# Patient Record
Sex: MALE | Race: WHITE | NOT HISPANIC OR LATINO | Employment: PART TIME | URBAN - METROPOLITAN AREA
[De-identification: names, ages, dates, MRNs, and addresses within clinical notes are randomized per-mention and may not be internally consistent; named-entity substitution may affect disease eponyms.]

---

## 2019-12-17 ENCOUNTER — CLINICAL SUPPORT (OUTPATIENT)
Dept: URGENT CARE | Facility: CLINIC | Age: 29
End: 2019-12-17

## 2019-12-17 PROCEDURE — 94010 BREATHING CAPACITY TEST: ICD-10-PCS | Mod: S$GLB,,, | Performed by: EMERGENCY MEDICINE

## 2019-12-17 PROCEDURE — 94010 BREATHING CAPACITY TEST: CPT | Mod: S$GLB,,, | Performed by: EMERGENCY MEDICINE

## 2019-12-18 ENCOUNTER — OFFICE VISIT (OUTPATIENT)
Dept: PULMONOLOGY | Facility: CLINIC | Age: 29
End: 2019-12-18
Payer: COMMERCIAL

## 2019-12-18 VITALS
OXYGEN SATURATION: 98 % | WEIGHT: 177.69 LBS | SYSTOLIC BLOOD PRESSURE: 142 MMHG | DIASTOLIC BLOOD PRESSURE: 76 MMHG | RESPIRATION RATE: 16 BRPM | HEIGHT: 71 IN | BODY MASS INDEX: 24.88 KG/M2 | HEART RATE: 88 BPM

## 2019-12-18 DIAGNOSIS — Z02.1 PRE-EMPLOYMENT EXAMINATION: ICD-10-CM

## 2019-12-18 PROCEDURE — 99203 PR OFFICE/OUTPT VISIT, NEW, LEVL III, 30-44 MIN: ICD-10-PCS | Mod: S$GLB,,, | Performed by: INTERNAL MEDICINE

## 2019-12-18 PROCEDURE — 99203 OFFICE O/P NEW LOW 30 MIN: CPT | Mod: S$GLB,,, | Performed by: INTERNAL MEDICINE

## 2019-12-18 PROCEDURE — 99999 PR PBB SHADOW E&M-EST. PATIENT-LVL III: ICD-10-PCS | Mod: PBBFAC,,, | Performed by: INTERNAL MEDICINE

## 2019-12-18 PROCEDURE — 99999 PR PBB SHADOW E&M-EST. PATIENT-LVL III: CPT | Mod: PBBFAC,,, | Performed by: INTERNAL MEDICINE

## 2019-12-18 PROCEDURE — 3008F PR BODY MASS INDEX (BMI) DOCUMENTED: ICD-10-PCS | Mod: CPTII,S$GLB,, | Performed by: INTERNAL MEDICINE

## 2019-12-18 PROCEDURE — 3008F BODY MASS INDEX DOCD: CPT | Mod: CPTII,S$GLB,, | Performed by: INTERNAL MEDICINE

## 2019-12-18 NOTE — PROGRESS NOTES
"12/18/2019    Shilpi Hewitt  New Patient History and Physical    Chief Complaint   Patient presents with    Follow-up       HPI: pt took inhaler when elementary school - never missed school at all, was on advair for 1 script- only one disc.  No symptoms after elementary school. Pt not sure ever had dx asthma.      Wishes to join [employer].  Had related had taken asthma medication to recuriter- had pft 12/17/19 with normal results.  Fev% 87%, fev1 108%.      No sinus nor cough/wheeze, nor dyspnea.  No symptoms since elementary school - since before 6th grade.      The chief compliant  problem is new to me",   PFSH:  No past medical history on file.      No past surgical history on file.  Social History     Tobacco Use    Smoking status: Never Smoker   Substance Use Topics    Alcohol use: Not on file    Drug use: Not on file     No family history on file.  Review of patient's allergies indicates:  No Known Allergies    Performance Status:The patient's activity level is regular exercise.      Review of Systems:  a review of eleven systems covering constitutional, Eye, HEENT, Psych, Respiratory, Cardiac, GI, , Musculoskeletal, Endocrine, Dermatologic was negative except for pertinent findings as listed ABOVE and below:  pertinent positive as above, rest is good       Exam:Comprehensive exam done. BP (!) 142/76   Pulse 88   Resp 16   Ht 5' 11" (1.803 m)   Wt 80.6 kg (177 lb 11.1 oz)   SpO2 98% Comment: room air  BMI 24.78 kg/m²   Exam included Vitals as listed, and patient's appearance and affect and alertness and mood, oral exam for yeast and hygiene and pharynx lesions and Mallapatti (M) score, neck with inspection for jvd and masses and thyroid abnormalities and lymph nodes (supraclavicular and infraclavicular nodes and axillary also examined and noted if abn), chest exam included symmetry and effort and fremitus and percussion and auscultation, cardiac exam included rhythm and gallops and murmur and rubs " and jvd and edema, abdominal exam for mass and hepatosplenomegaly and tenderness and hernias and bowel sounds, Musculoskeletal exam with muscle tone and posture and mobility/gait and  strength, and skin for rashes and cyanosis and pallor and turgor, extremity for clubbing.  Findings were normal except for pertinent findings listed below:  M1,   2019  was not done       Radiographs (ct chest and cxr) reviewed: results reviewed      Labs was not done           PFT results reviewed  pft 19 with normal results.  Fev% 87%, fev1 108%.          Plan:  Clinical impression is apparently straight forward and impression with management as below.    Shilpi was seen today for follow-up.    Diagnoses and all orders for this visit:    Pre-employment examination        Follow up if symptoms worsen or fail to improve.    Discussed with patient above for education the following:      Patient Instructions   No asthma  By history.      To whom it may concern:    Re: Shilpi Hewitt,  1990      Pt describes using one script for advair inhaler prior to the age of 13, prior to 6th grade.  He never continued inhaler beyond that one time use of inhaler.   Pt had had no exercise limitations nor cough nor wheeze nor shortness breath.  Pt had normal pulmonary functions done 19-   Fev% 87%, fev1 108%.   He has no current respiratory limits nor symptoms.    Pt has no history of asthma from what I see.  He certainly had no asthma therapy beyond what is mentioned above.  There is no respiratory diseases that would limit Mr Hewitt's future activity.

## 2019-12-18 NOTE — LETTER
[facility]  [address]  [place] [address]  Phone: [phone]  Fax: [phone]       2019      To whom it may concern:    Re: Shilpi Hewitt,  1990      Pt describes using one script for advair inhaler prior to the age of 13, prior to 6th grade.  He never continued inhaler beyond that one time use of inhaler.   Pt had had no exercise limitations nor cough nor wheeze nor shortness breath.  Pt had normal pulmonary functions done 19-   Fev% 87%, fev1 108%.   He has no current respiratory limits nor symptoms.    Pt has no history of asthma from what I see.  He certainly had no asthma therapy beyond what is mentioned above.  There is no respiratory diseases that would limit Mr Hewitt's future activity.      Thank You,         Ronen Hermosillo MD  Pulmonary Diseases.

## 2019-12-18 NOTE — PATIENT INSTRUCTIONS
No asthma  By history.      To whom it may concern:    Re: Shilpi Hewitt,  1990      Pt describes using one script for advair inhaler prior to the age of 13, prior to 6th grade.  He never continued inhaler beyond that one time use of inhaler.   Pt had had no exercise limitations nor cough nor wheeze nor shortness breath.  Pt had normal pulmonary functions done 19-   Fev% 87%, fev1 108%.   He has no current respiratory limits nor symptoms.    Pt has no history of asthma from what I see.  He certainly had no asthma therapy beyond what is mentioned above.  There is no respiratory diseases that would limit Mr Hewitt's future activity.

## 2020-01-02 ENCOUNTER — TELEPHONE (OUTPATIENT)
Dept: PULMONOLOGY | Facility: CLINIC | Age: 30
End: 2020-01-02

## 2020-01-02 DIAGNOSIS — Z02.1 PRE-EMPLOYMENT EXAMINATION: Primary | ICD-10-CM

## 2020-01-02 NOTE — TELEPHONE ENCOUNTER
----- Message from Kira Hubbard sent at 1/2/2020  7:37 AM CST -----  Type: Needs Medical Advice    Who Called:  Patient  Best Call Back Number: [phone]  Additional Information: Patient needs to speak with nurse concerning having Pulmonary Function Test done/please call back to advise.

## 2020-03-23 PROBLEM — Z02.1 PRE-EMPLOYMENT EXAMINATION: Status: RESOLVED | Noted: 2019-12-18 | Resolved: 2020-03-23

## 2020-06-16 ENCOUNTER — TELEPHONE (OUTPATIENT)
Dept: PHYSICAL MEDICINE AND REHAB | Facility: CLINIC | Age: 30
End: 2020-06-16

## 2020-06-16 NOTE — TELEPHONE ENCOUNTER
----- Message from Jumana Malik sent at 6/16/2020 11:19 AM CDT -----  Regarding: New pt appt needed  Type: Sooner appointment request    Caller is requesting a sooner appointment.    Who Called:  Pt  When is the first available appointment: none listed  Symptoms:  muscle issues in chest arms and back  Best call back number:  [phone]  Additional Information:  Pt requesting a call back to schedule a new pt appt. Please advise.

## 2020-06-16 NOTE — TELEPHONE ENCOUNTER
----- Message from Henna Lopez sent at 6/16/2020 11:08 AM CDT -----  Type: Needs Medical Advice  Who Called:  Pt  Symptoms (please be specific):    How long has patient had these symptoms:    Pharmacy name and phone #:    Best Call Back Number: [phone]  Additional Information: New Pt called to schedule an appt, but have questions. Please contact pt

## 2020-06-23 ENCOUNTER — OFFICE VISIT (OUTPATIENT)
Dept: PHYSICAL MEDICINE AND REHAB | Facility: CLINIC | Age: 30
End: 2020-06-23
Payer: COMMERCIAL

## 2020-06-23 DIAGNOSIS — M62.82 EXERTIONAL RHABDOMYOLYSIS: Primary | ICD-10-CM

## 2020-06-23 PROCEDURE — 99203 OFFICE O/P NEW LOW 30 MIN: CPT | Mod: S$GLB,,, | Performed by: PHYSICAL MEDICINE & REHABILITATION

## 2020-06-23 PROCEDURE — 99999 PR PBB SHADOW E&M-EST. PATIENT-LVL I: CPT | Mod: PBBFAC,,, | Performed by: PHYSICAL MEDICINE & REHABILITATION

## 2020-06-23 PROCEDURE — 99999 PR PBB SHADOW E&M-EST. PATIENT-LVL I: ICD-10-PCS | Mod: PBBFAC,,, | Performed by: PHYSICAL MEDICINE & REHABILITATION

## 2020-06-23 PROCEDURE — 99203 PR OFFICE/OUTPT VISIT, NEW, LEVL III, 30-44 MIN: ICD-10-PCS | Mod: S$GLB,,, | Performed by: PHYSICAL MEDICINE & REHABILITATION

## 2020-06-23 NOTE — PROGRESS NOTES
"[facility]    CHIEF COMPLAINT: Episodes of transient swelling, pain, and weakness    HISTORY OF PRESENT ILLNESS: Shilpi Hewitt is a 29 y.o. male who presents to me for evaluation of chest/arm swelling. After high intensity push ups ~ 100 immediately before bed, he noticed swelling in his chest and triceps that was symmetrical.  He had been doing a  10 day training regimen, then 3 day vacation, and then had a severe decrease in his performance at only being able to do 50 pushups.  This episode occurred in March before quarantine, where he noticed bilateral chest and long head of triceps swelling "like a boob."  He took 1 Naproxen and 1 Tylenol the next morning, but did not notice any issues after taking the medicine. Typically, he does not take any medicine. He denied tobacco, alcohol, or drug use. However, he noticed his urine was darker and had decreased BUE ROM due to swelling and pain. After 3 days, he noticed that the swelling resolved but he had persistent weakness with push-ups.  He was back to his normal level of strength after 1 month. Denied trauma, warmth, or erythema of his joints.    2 weeks ago, he noticed swelling after a workout of 5 miles and multiple body weight exercises to failure. He noticed that it was extremely muggy and hot that afternoon. He noticed the swelling restarting at 4PM that afternoon with a sensation of "pins and needles." at his triceps and chest. He did have some Latissimus swelling during the second episode.  The swelling decreased after one day.  He felt like his strength gradually improved but still has some residual weakness.  He thinks he may have had rhabdomyolysis.  He wants the knowledge to prevent this from happening again.  He father has an alkaline water machine, and he notices that he is better hydrated than the rest of his peers. He is drinking 2 gallons of water per day. He did not notice any urine discoloration this time.  He grew up " swimming since age 8. He said that he was doing 40 miles per week between swimming and running days alternated. He has decreased his mileage to 25 miles per week recently as this is all that is required by [employer].  Denied fasciculations, spasms, numbness, tingling, swelling, clicking, locking, or night pain.    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  His shipment date was pushed back after his first episode of weakness. His shipment date is now scheduled for September.  He has been better about taking recovery days, and thinks he will not be working out as often once he is in the dive program. He hopes to be placed in [place] or [facility]. Push ups are his weakest exercise and would be the only thing that could compromise his qualification for his desired dive program. He did go to [facility] urgent care [address] 1 week ago (1 week after the second episode of weakness) and had the following lab results (6/15/20):  CO2 19  Ca 10.6  AST 62  ALT 96  CK 1304  He was told he had elevated CK and not told any additional details.    Review of Systems   Constitutional: Negative for fever.   HENT: Negative for drooling.    Eyes: Negative for discharge.   Respiratory: Negative for choking.    Cardiovascular: Negative for chest pain.   Genitourinary: Negative for flank pain.   Skin: Negative for wound.   Allergic/Immunologic: Negative for immunocompromised state.   Neurological: Negative for tremors and syncope.   Psychiatric/Behavioral: Negative for behavioral problems.     Past Medical History: No past medical history on file.    Past Surgical History: No past surgical history on file. Tonsillectomy    Family History: No family history on file.   Diabetes Mellitus    Medications:   No current outpatient medications on file prior to visit.     No current facility-administered medications on file prior to visit.    Amino Acid  Ashwaganda  Lion's J Luis  Vitamin D  Magnesium  Vitamin C    Allergies: Review of  patient's allergies indicates:  No Known Allergies    Social History:   Social History     Socioeconomic History    Marital status: Single     Spouse name: Not on file    Number of children: Not on file    Years of education: Not on file    Highest education level: Not on file   Occupational History    Not on file   Social Needs    Financial resource strain: Not on file    Food insecurity     Worry: Not on file     Inability: Not on file    Transportation needs     Medical: Not on file     Non-medical: Not on file   Tobacco Use    Smoking status: Never Smoker   Substance and Sexual Activity    Alcohol use: Not on file    Drug use: Not on file    Sexual activity: Not on file   Lifestyle    Physical activity     Days per week: Not on file     Minutes per session: Not on file    Stress: Not on file   Relationships    Social connections     Talks on phone: Not on file     Gets together: Not on file     Attends Rastafarian service: Not on file     Active member of club or organization: Not on file     Attends meetings of clubs or organizations: Not on file     Relationship status: Not on file   Other Topics Concern    Not on file   Social History Narrative    Not on file     Shilpi JUNIOR teaching chinese students. He went to [facility] clinical mental health counseling [facility].    PHYSICAL EXAMINATION:   General  VSS  Constitutional: Oriented to person, place, and time. No apparent distress. Pleasant.  HENT:   Head: Normocephalic and atraumatic.   Eyes: Right eye exhibits no discharge. Left eye exhibits no discharge. No scleral icterus.   Pulmonary/Chest: Effort normal. No respiratory distress.   Abdominal: There is no guarding.   Neurological: Alert and oriented to person, place, and time.   Psychiatric: Behavior is normal.   Back Exam     Tenderness   The patient is experiencing no tenderness.     Range of Motion   The patient has normal back ROM.    Muscle Strength   The patient has normal back  strength.    Reflexes   Patellar: 2/4  Achilles: 2/4  Biceps: 2/4    Other   Toe walk: normal  Heel walk: normal  Sensation: normal  Gait: normal   Erythema: no back redness  Scars: absent      Right Elbow Exam   Right elbow exam is normal.      Left Elbow Exam   Left elbow exam is normal.      Right Shoulder Exam   Right shoulder exam is normal.      Left Shoulder Exam   Left shoulder exam is normal.        INSPECTION: There is no swelling, ecchymoses, erythema or gross deformity.    ASSESSMENT:   1. Exertional rhabdomyolysis       PLAN:     1. Time was spent reviewing the above diagnosis in depth with Shilpi today, including acute management and rehabilitation.     2. His history is most consistent with rhabdomyolysis. We discussed the process of muscle breakdown during rhabdomyolysis and counseled the patient on methods to prevent future episodes with proper rest, nutrition, and exercise routine. Answered the patients' numerous questions about water alkalinity, massage, liver enzymes, supplements, stretching, compression, and exercise. With a lack of systemic findings or other red-flag symptoms, it is unlikely that the patient has a systemic disease that requires further work up at this time. We discussed that working through some muscle soreness is reasonable, however, if he notices brown colored urine, that would be an indication to discontinue PT/PE.    3. Patient advised to call/message for any questions or if he has return of symptoms    The above note was completed, in part, with the aid of Dragon dictation software/hardware. Translation errors may be present.